# Patient Record
Sex: FEMALE | Race: OTHER | RURAL
[De-identification: names, ages, dates, MRNs, and addresses within clinical notes are randomized per-mention and may not be internally consistent; named-entity substitution may affect disease eponyms.]

---

## 2017-07-11 NOTE — PATIENT DISCUSSION
CATARACTS, OU- NOT VISUALLY SIGNIFICANT. DISC OPTION OF [id number]. GLASSES RX GIVEN TO FILL IF DESIRES.

## 2017-07-11 NOTE — PATIENT DISCUSSION
EPISODE OF TRANSIENT BLURRED VISION, OU- NO SIGNS OR SOURCES. MOST LIKELY DUE TO SYSTEMIC AND NOT OCULAR. PT TO FOLLOW WITH PCP FOR ANY OTHER EPISODES OF NAUSEA.

## 2017-07-11 NOTE — PATIENT DISCUSSION
MARIETTA/K SICCA, OU- LIKELY CAUSE OF INTERMITTENT BLURRED VA PRESCRIBED ARTIFICIAL TEARS BID - QID, OU AND THE DAILY INTAKE OF OMEGA 3/FATTY ACIDS. CONSIDER OTHER TREATMENT OPTIONS IF SYMPTOMS PERSIST/WORSEN. FOLLOW.

## 2018-03-08 NOTE — PATIENT DISCUSSION
REVIEWED PVD/RD PRECAUTIONS WITH PATIENT AND ADVISED TO CALL IF EXPERIENCES NEW FLASHES OF LIGHT, INCREASE IN FLOATERS, OR DECREASE VISION IN EITHER EYE.

## 2022-09-29 ENCOUNTER — NEW PATIENT (OUTPATIENT)
Dept: RURAL CLINIC 2 | Facility: CLINIC | Age: 72
End: 2022-09-29

## 2022-09-29 DIAGNOSIS — H16.223: ICD-10-CM

## 2022-09-29 DIAGNOSIS — H40.023: ICD-10-CM

## 2022-09-29 DIAGNOSIS — H52.4: ICD-10-CM

## 2022-09-29 DIAGNOSIS — H52.03: ICD-10-CM

## 2022-09-29 DIAGNOSIS — H25.813: ICD-10-CM

## 2022-09-29 PROCEDURE — 92015 DETERMINE REFRACTIVE STATE: CPT

## 2022-09-29 PROCEDURE — 99204 OFFICE O/P NEW MOD 45 MIN: CPT

## 2022-09-29 ASSESSMENT — VISUAL ACUITY
OS_CC: 20/30-1
OU_CC: J2
OD_CC: 20/40

## 2022-09-29 ASSESSMENT — TONOMETRY
OD_IOP_MMHG: 17
OS_IOP_MMHG: 17

## 2022-10-10 ENCOUNTER — FOLLOW UP (OUTPATIENT)
Dept: RURAL CLINIC 2 | Facility: CLINIC | Age: 72
End: 2022-10-10

## 2022-10-10 DIAGNOSIS — H52.4: ICD-10-CM

## 2022-10-10 DIAGNOSIS — H16.223: ICD-10-CM

## 2022-10-10 DIAGNOSIS — H25.813: ICD-10-CM

## 2022-10-10 PROCEDURE — 99213 OFFICE O/P EST LOW 20 MIN: CPT

## 2022-10-10 ASSESSMENT — VISUAL ACUITY
OU_CC: 20/25-1
OD_SC: 20/30-1
OU_SC: 20/200
OU_CC: 20/50
OS_PH: 20/40
OU_SC: 20/25
OS_CC: 20/25
OS_SC: 20/100
OD_CC: 20/25-1

## 2022-10-10 ASSESSMENT — TONOMETRY
OS_IOP_MMHG: 17
OD_IOP_MMHG: 18

## 2022-11-30 ENCOUNTER — CONSULTATION/EVALUATION (OUTPATIENT)
Dept: URBAN - NONMETROPOLITAN AREA CLINIC 1 | Facility: CLINIC | Age: 72
End: 2022-11-30

## 2022-11-30 DIAGNOSIS — H25.813: ICD-10-CM

## 2022-11-30 PROCEDURE — 99214 OFFICE O/P EST MOD 30 MIN: CPT

## 2022-11-30 ASSESSMENT — VISUAL ACUITY
OD_BAT: 20/80
OS_AM: 20/30
OS_CC: 20/30-2
OD_CC: 20/40
OD_PAM: 20/25
OS_CC: 20/50
OD_SC: 20/40
OD_CC: 20/30-2
OS_SC: 20/200
OS_BAT: 20/400

## 2022-11-30 ASSESSMENT — TONOMETRY
OD_IOP_MMHG: 15
OS_IOP_MMHG: 15

## 2022-12-14 ENCOUNTER — PRE-OP/H&P (OUTPATIENT)
Dept: URBAN - NONMETROPOLITAN AREA CLINIC 1 | Facility: CLINIC | Age: 72
End: 2022-12-14

## 2022-12-14 VITALS
DIASTOLIC BLOOD PRESSURE: 84 MMHG | WEIGHT: 150 LBS | HEART RATE: 60 BPM | SYSTOLIC BLOOD PRESSURE: 134 MMHG | BODY MASS INDEX: 29.45 KG/M2 | HEIGHT: 60 IN

## 2022-12-14 PROCEDURE — 99499 UNLISTED E&M SERVICE: CPT

## 2023-01-03 ENCOUNTER — SURGERY/PROCEDURE (OUTPATIENT)
Dept: URBAN - NONMETROPOLITAN AREA CLINIC 1 | Facility: CLINIC | Age: 73
End: 2023-01-03

## 2023-01-03 DIAGNOSIS — H25.812: ICD-10-CM

## 2023-01-03 PROCEDURE — 66999LRI LRI

## 2023-01-03 PROCEDURE — 66984CV REMOVE CATARACT, INSERT LENS, CUSTOM VISION

## 2023-01-03 PROCEDURE — 68841 INSJ RX ELUT IMPLT LAC CANAL: CPT

## 2023-01-04 ENCOUNTER — POST OP/EVAL OF SECOND EYE (OUTPATIENT)
Dept: URBAN - NONMETROPOLITAN AREA CLINIC 1 | Facility: CLINIC | Age: 73
End: 2023-01-04

## 2023-01-04 VITALS
SYSTOLIC BLOOD PRESSURE: 118 MMHG | WEIGHT: 150 LBS | HEART RATE: 56 BPM | BODY MASS INDEX: 28.32 KG/M2 | DIASTOLIC BLOOD PRESSURE: 79 MMHG | HEIGHT: 61 IN

## 2023-01-04 DIAGNOSIS — Z98.42: ICD-10-CM

## 2023-01-04 DIAGNOSIS — H25.811: ICD-10-CM

## 2023-01-04 DIAGNOSIS — Z01.818: ICD-10-CM

## 2023-01-04 PROCEDURE — 99024 POSTOP FOLLOW-UP VISIT: CPT

## 2023-01-04 ASSESSMENT — VISUAL ACUITY
OD_CC: 20/40
OD_CC: 20/30
OD_SC: 20/40
OS_SC: 20/50
OD_BAT: 20/80
OD_PAM: 20/25
OS_PH: 20/30

## 2023-01-04 ASSESSMENT — TONOMETRY
OD_IOP_MMHG: 12
OS_IOP_MMHG: 12

## 2023-01-10 ENCOUNTER — POST-OP (OUTPATIENT)
Dept: URBAN - NONMETROPOLITAN AREA CLINIC 1 | Facility: CLINIC | Age: 73
End: 2023-01-10

## 2023-01-10 DIAGNOSIS — Z98.42: ICD-10-CM

## 2023-01-10 PROCEDURE — 99024 POSTOP FOLLOW-UP VISIT: CPT

## 2023-01-10 ASSESSMENT — VISUAL ACUITY
OU_SC: 20/30-1
OS_PH: 20/30-1
OD_SC: 20/40-1
OD_PH: 20/25-2
OS_SC: 20/70-1

## 2023-01-10 ASSESSMENT — TONOMETRY
OS_IOP_MMHG: 13
OD_IOP_MMHG: 14

## 2023-01-17 ENCOUNTER — POST-OP (OUTPATIENT)
Dept: URBAN - NONMETROPOLITAN AREA CLINIC 1 | Facility: CLINIC | Age: 73
End: 2023-01-17

## 2023-01-17 ENCOUNTER — SURGERY/PROCEDURE (OUTPATIENT)
Dept: URBAN - NONMETROPOLITAN AREA CLINIC 1 | Facility: CLINIC | Age: 73
End: 2023-01-17

## 2023-01-17 DIAGNOSIS — Z98.42: ICD-10-CM

## 2023-01-17 DIAGNOSIS — H25.811: ICD-10-CM

## 2023-01-17 DIAGNOSIS — Z01.818: ICD-10-CM

## 2023-01-17 PROCEDURE — 66999LRI LRI

## 2023-01-17 PROCEDURE — 66984 XCAPSL CTRC RMVL W/O ECP: CPT

## 2023-01-17 PROCEDURE — 68841 INSJ RX ELUT IMPLT LAC CANAL: CPT

## 2023-01-17 PROCEDURE — 99024 POSTOP FOLLOW-UP VISIT: CPT

## 2023-01-17 ASSESSMENT — TONOMETRY
OD_IOP_MMHG: 17
OS_IOP_MMHG: 12

## 2023-01-17 ASSESSMENT — VISUAL ACUITY
OS_SC: 20/50
OD_SC: 20/25

## 2023-01-25 ENCOUNTER — POST-OP (OUTPATIENT)
Dept: URBAN - NONMETROPOLITAN AREA CLINIC 1 | Facility: CLINIC | Age: 73
End: 2023-01-25

## 2023-01-25 DIAGNOSIS — H25.811: ICD-10-CM

## 2023-01-25 DIAGNOSIS — Z98.41: ICD-10-CM

## 2023-01-25 DIAGNOSIS — Z98.42: ICD-10-CM

## 2023-01-25 DIAGNOSIS — Z01.818: ICD-10-CM

## 2023-01-25 PROCEDURE — 99024 POSTOP FOLLOW-UP VISIT: CPT

## 2023-01-25 ASSESSMENT — TONOMETRY
OD_IOP_MMHG: 16
OS_IOP_MMHG: 16

## 2023-01-25 ASSESSMENT — VISUAL ACUITY
OS_PH: 20/30-1
OD_PH: 20/25-2
OD_SC: 20/40
OS_SC: 20/50

## 2024-09-18 ENCOUNTER — FOLLOW UP (OUTPATIENT)
Dept: URBAN - NONMETROPOLITAN AREA CLINIC 1 | Facility: CLINIC | Age: 74
End: 2024-09-18

## 2024-09-18 DIAGNOSIS — H40.013: ICD-10-CM

## 2024-09-18 DIAGNOSIS — Z98.42: ICD-10-CM

## 2024-09-18 DIAGNOSIS — Z98.41: ICD-10-CM

## 2024-09-18 DIAGNOSIS — H52.4: ICD-10-CM

## 2024-09-18 PROCEDURE — 99214 OFFICE O/P EST MOD 30 MIN: CPT

## 2024-09-18 PROCEDURE — 92015 DETERMINE REFRACTIVE STATE: CPT

## 2025-06-25 ENCOUNTER — EMERGENCY VISIT (OUTPATIENT)
Age: 75
End: 2025-06-25

## 2025-06-25 DIAGNOSIS — H00.015: ICD-10-CM

## 2025-06-25 PROCEDURE — 99213 OFFICE O/P EST LOW 20 MIN: CPT
